# Patient Record
Sex: FEMALE
[De-identification: names, ages, dates, MRNs, and addresses within clinical notes are randomized per-mention and may not be internally consistent; named-entity substitution may affect disease eponyms.]

---

## 2020-06-29 ENCOUNTER — HOSPITAL ENCOUNTER (EMERGENCY)
Dept: HOSPITAL 88 - FSED | Age: 30
Discharge: HOME | End: 2020-06-29
Payer: COMMERCIAL

## 2020-06-29 VITALS — BODY MASS INDEX: 48.82 KG/M2 | WEIGHT: 293 LBS | HEIGHT: 65 IN

## 2020-06-29 DIAGNOSIS — M79.10: ICD-10-CM

## 2020-06-29 DIAGNOSIS — J02.9: ICD-10-CM

## 2020-06-29 DIAGNOSIS — R42: Primary | ICD-10-CM

## 2020-06-29 PROCEDURE — 71046 X-RAY EXAM CHEST 2 VIEWS: CPT

## 2020-06-29 PROCEDURE — 99283 EMERGENCY DEPT VISIT LOW MDM: CPT

## 2020-06-29 NOTE — DIAGNOSTIC IMAGING REPORT
EXAMINATION: PA and lateral views of the chest.



COMPARISON: None



CLINICAL HISTORY:  Bodyaches, dizziness, shortness of breath

     

DISCUSSION:



Lines/tubes:  None.



Lungs:  The lungs are well inflated and clear. There is no evidence of

pneumonia or pulmonary edema.



Pleura:  There is no pleural effusion or pneumothorax.



Heart and mediastinum:  Cardiomediastinal silhouette is unremarkable.   

Pulmonary vasculature is normal.



Bones and soft tissues:  No acute bony abnormalities.  



IMPRESSION: 

No acute cardiopulmonary abnormalities.











Signed by: Dr. Noé Acevedo M.D. on 6/29/2020 6:15 AM

## 2020-06-29 NOTE — EMERGENCY DEPARTMENT NOTE
History of Present Illnes


History of Present Illness


Chief Complaint:  General Medicine Complaints


History of Present Illness


This is a 29 year old  female Chief Complaint Comment PT C/O BODYACHES 

THAT STARTED YESTERDAY, TODAY STARTED WITH DIZZINESS AND LIGHT-HEADED, DENIES 

COUGH OR FEVERS BUT STATES HAD A SORE THROAT 3 DAYS AGO WHICH HAS ALSO RESOLVED.




 .


Historian:  Patient


Arrival Mode:  Car


Onset (how long ago):  day(s) (2)


Location:  throat


Quality:  dull


Radiation:  Denies non-radiation, Denies back, Denies neck, Denies extremity, 

Denies abdomen, Denies periumbilical, Denies flank, Denies proximal, Denies 

distal, Denies other


Severity:  moderate


Onset quality:  gradual


Duration (how long):  day(s) (2)


Timing of current episode:  constant


Progression:  waxing and waning


Chronicity:  new


Context:  Denies recent illness, Denies recent surgery, Denies recent 

immobilization, Denies recent travel, Denies trauma/injury, Denies new 

medications, Denies hx of DVT/PE, Denies non-compliance w/ medications, Denies 

other


Relieving factors:  none


Exacerbating factors:  none


Treatments prior to arrival:  none





Past Medical/Family History


Physician Review


I have reviewed the patient's past medical and family history.  Any updates have

been documented here.





Past Medical History


Recent Fever:  No


Clinical Suspicion of Infectio:  No


New/Unexplained Change in Ment:  No


Past Medical History:  Hypertension


Past Surgical History:  None





Other


Last Tetanus:  UNK





Review of Systems


Review of Systems


Constitutional:  Reports no symptoms


EENTM:  Reports as per HPI


Cardiovascular:  Reports no symptoms


Respiratory:  Reports no symptoms


Gastrointestinal:  Reports no symptoms


Genitourinary:  Reports no symptoms


Musculoskeletal:  Reports no symptoms


Integumentary:  Reports no symptoms


Neurological:  Reports no symptoms


Psychological:  Reports no symptoms


Endocrine:  Reports no symptoms


Hematological/Lymphatic:  Reports no symptoms





Physical Exam


Related Data


Allergies:  


Coded Allergies:  


     No Known Allergies (Unverified , 6/29/20)


Triage Vital Signs





Vital Signs








  Date Time  Temp Pulse Resp B/P (MAP) Pulse Ox O2 Delivery O2 Flow Rate FiO2


 


6/29/20 05:45 97.6 108 22 142/86 98   








Vital signs reviewed:  Yes





Physical Exam


CONSTITUTIONAL





Constitutional:  Present well-developed, Present well-nourished


HENT


HENT:  Present normocephalic, Present atraumatic, Present oropharynx 

clear/moist, Present nose normal, Present erythema


HENT L/R:  Present left ext ear normal, Present right ext ear normal


EYES





Eyes:  Reports PERRL, Reports conjunctivae normal


NECK


Neck:  Present ROM normal


PULMONARY


Pulmonary:  Present effort normal, Present breath sounds normal


CARDIOVASCULAR





Cardiovascular:  Present regular rhythm, Present heart sounds normal, Present 

capillary refill normal, Present normal rate


GASTROINTESTINAL





Abdominal:  Present soft, Present nontender, Present bowel sounds normal


GENITOURINARY





Genitourinary:  Present exam deferred


SKIN


Skin:  Present warm, Present dry


MUSCULOSKELETAL





Musculoskeletal:  Present ROM normal


NEUROLOGICAL





Neurological:  Present alert, Present oriented x 3, Present no gross motor or 

sensory deficits


PSYCHOLOGICAL


Psychological:  Present mood/affect normal, Present judgement normal





Results


Imaging


Imaging results reviewed:  Yes





Assessment & Plan


Medical Decision Making


MDM


pharyngitis covid





Reassessment


Reassessment time:  06:24


Reassessment


same





Assessment & Plan


Final Impression:  


(1) Acute pharyngitis


(2) Myalgia


Depart Disposition:  HOME, SELF-CARE


Last Vital Signs











  Date Time  Temp Pulse Resp B/P (MAP) Pulse Ox O2 Delivery O2 Flow Rate FiO2


 


6/29/20 05:45 97.6 108 22 142/86 98   

















JESENIA VIERA MD             Jun 29, 2020 06:25

## 2021-10-22 ENCOUNTER — HOSPITAL ENCOUNTER (EMERGENCY)
Dept: HOSPITAL 88 - ER | Age: 31
Discharge: HOME | End: 2021-10-22
Payer: COMMERCIAL

## 2021-10-22 VITALS — DIASTOLIC BLOOD PRESSURE: 65 MMHG | SYSTOLIC BLOOD PRESSURE: 132 MMHG

## 2021-10-22 VITALS — HEIGHT: 65 IN | WEIGHT: 260 LBS | BODY MASS INDEX: 43.32 KG/M2

## 2021-10-22 DIAGNOSIS — S13.4XXA: Primary | ICD-10-CM

## 2021-10-22 DIAGNOSIS — E11.9: ICD-10-CM

## 2021-10-22 DIAGNOSIS — I10: ICD-10-CM
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
## 2021-10-22 DIAGNOSIS — Z98.84: ICD-10-CM
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
## 2021-10-22 DIAGNOSIS — S20.219A: ICD-10-CM

## 2021-10-22 PROCEDURE — 99282 EMERGENCY DEPT VISIT SF MDM: CPT
